# Patient Record
Sex: MALE | Race: WHITE | NOT HISPANIC OR LATINO | Employment: OTHER | URBAN - METROPOLITAN AREA
[De-identification: names, ages, dates, MRNs, and addresses within clinical notes are randomized per-mention and may not be internally consistent; named-entity substitution may affect disease eponyms.]

---

## 2019-09-12 ENCOUNTER — OFFICE VISIT (OUTPATIENT)
Dept: FAMILY MEDICINE CLINIC | Facility: CLINIC | Age: 69
End: 2019-09-12

## 2019-09-12 VITALS
HEIGHT: 67 IN | DIASTOLIC BLOOD PRESSURE: 82 MMHG | SYSTOLIC BLOOD PRESSURE: 142 MMHG | BODY MASS INDEX: 26.18 KG/M2 | TEMPERATURE: 97.6 F | HEART RATE: 81 BPM | WEIGHT: 166.8 LBS | OXYGEN SATURATION: 97 %

## 2019-09-12 DIAGNOSIS — J32.9 BACTERIAL SINUSITIS: Primary | ICD-10-CM

## 2019-09-12 DIAGNOSIS — B96.89 BACTERIAL SINUSITIS: Primary | ICD-10-CM

## 2019-09-12 DIAGNOSIS — J30.89 NON-SEASONAL ALLERGIC RHINITIS, UNSPECIFIED TRIGGER: ICD-10-CM

## 2019-09-12 PROCEDURE — 99204 OFFICE O/P NEW MOD 45 MIN: CPT | Performed by: FAMILY MEDICINE

## 2019-09-12 RX ORDER — ASPIRIN 325 MG
325 TABLET ORAL DAILY PRN
Status: CANCELLED | OUTPATIENT
Start: 2019-09-12

## 2019-09-12 RX ORDER — FLUTICASONE PROPIONATE 50 MCG
2 SPRAY, SUSPENSION (ML) NASAL DAILY
Qty: 11.1 ML | Refills: 2 | Status: SHIPPED | OUTPATIENT
Start: 2019-09-12 | End: 2019-10-16 | Stop reason: SDUPTHER

## 2019-09-12 RX ORDER — AMOXICILLIN 875 MG/1
875 TABLET, COATED ORAL 2 TIMES DAILY
Qty: 20 TABLET | Refills: 0 | Status: SHIPPED | OUTPATIENT
Start: 2019-09-12 | End: 2019-09-22

## 2019-09-12 RX ORDER — FEXOFENADINE HCL 180 MG/1
180 TABLET ORAL DAILY
Qty: 30 TABLET | Refills: 2 | Status: SHIPPED | OUTPATIENT
Start: 2019-09-12

## 2019-09-12 RX ORDER — ASPIRIN 325 MG
325 TABLET ORAL DAILY
COMMUNITY
End: 2019-09-12

## 2019-09-12 NOTE — PROGRESS NOTES
Roberth Bonilla is a 68 y.o. male.     Chief Complaint   Patient presents with   • Sinusitis     Patient is wanting to establish primary care. He has sinus congestion that keeps coming and going        HPI     Pt is a pleasant 68 y.o. YO male here for sinus congestion.  Patient has significant past medical history of allergies. Seen and treated by allergist, underwent allergy shots to completion and symptoms were initially better but returned in a couple years. He is not interested in undergoing shots again for a few years of relief. He has no other significant medical history.     He began to have issues with congestion/runny nose about 6-8 weeks ago initially symptoms seemed to improve but then worsened again a couple weeks ago at which point he felt he needed to see a doctor. He tried multiple OTC medications including mucinex-D and phenylephrine nasal spray, he did not try any antihistamines. He has significant congestion with post nasal drip as well as maxillary sinus pain. He denies fever or chills. No cough or sore throat.     When I discussed health maintenance with him including recommendations for cancer screening and immunizations he said he was not interested in this. Went ahead and discussed recommendations for colon cancer screening, abdominal aneurysm screening ( he has a history of smoking), as well as recommendations for the shingles, pneumonia, tetanus, and influenza vaccines. Patient was somewhat interested in aneurysm screening and shingles vaccine ( he has had shingles before and it was terrible) and said he would give more thought to these.     Blood pressure mildly elevated in office today, on repeat manual later in visit higher still from 142/82 to 150/90. Patient says his blood pressure is always higher in clinical environment. He has a blood pressure cuff at home and checks his blood pressure a couple times a week, it is usually in the 130s systolic.     Exercises at the [facility] 3 times a  week    The following portions of the patient's history were reviewed and updated as appropriate: allergies, current medications, past family history, past medical history, past social history, past surgical history and problem list.    Review of Systems   Constitutional: Negative for appetite change and fever.   HENT: Positive for congestion, postnasal drip and sinus pressure.    Eyes: Negative for redness and itching.   Respiratory: Negative for cough and wheezing.    Cardiovascular: Negative for chest pain and palpitations.   Gastrointestinal: Negative for abdominal pain and diarrhea.   Endocrine: Negative for cold intolerance and heat intolerance.   Genitourinary: Negative for dysuria and frequency.   Musculoskeletal: Negative for back pain and neck pain.   Skin: Negative for rash and wound.   Allergic/Immunologic: Positive for environmental allergies. Negative for food allergies.   Neurological: Positive for dizziness. Negative for headaches.   Hematological: Negative for adenopathy. Does not bruise/bleed easily.   Psychiatric/Behavioral: Negative for agitation and confusion.       Objective  Vitals:    09/12/19 0808   BP: 142/82   Pulse: 81   Temp: 97.6 °F (36.4 °C)   SpO2: 97%        Physical Exam   Constitutional: He is oriented to person, place, and time. He appears well-developed and well-nourished. No distress.   HENT:   Head: Normocephalic and atraumatic.   Right Ear: External ear and ear canal normal. Tympanic membrane is scarred.   Left Ear: External ear and ear canal normal. Tympanic membrane is scarred.   Nose: Mucosal edema, rhinorrhea and septal deviation present.   Mouth/Throat: Oropharynx is clear and moist. No oropharyngeal exudate, posterior oropharyngeal edema or posterior oropharyngeal erythema.   Septal deviation to the left obstructing part of nasal passage with mucosal edema. Positive for maxillary sinus tenderness to percussion bilaterally, frontal sinuses negative   Eyes: Conjunctivae  are normal. Right eye exhibits no discharge. Left eye exhibits no discharge. No scleral icterus.   Cardiovascular: Normal rate and regular rhythm. Exam reveals no gallop and no friction rub.   No murmur heard.  Pulmonary/Chest: Effort normal and breath sounds normal. No respiratory distress. He has no wheezes. He has no rales.   Neurological: He is alert and oriented to person, place, and time.   Skin: Skin is warm and dry.   Psychiatric: He has a normal mood and affect. His behavior is normal. Judgment and thought content normal.   Vitals reviewed.        Current Outpatient Medications:   •  amoxicillin (AMOXIL) 875 MG tablet, Take 1 tablet by mouth 2 (Two) Times a Day for 10 days., Disp: 20 tablet, Rfl: 0  •  fexofenadine (ALLEGRA) 180 MG tablet, Take 1 tablet by mouth Daily., Disp: 30 tablet, Rfl: 2  •  fluticasone (FLONASE) 50 MCG/ACT nasal spray, 2 sprays into the nostril(s) as directed by provider Daily., Disp: 11.1 mL, Rfl: 2    Elsi Lepe was seen today for sinusitis.    Diagnoses and all orders for this visit:    Bacterial sinusitis  -     amoxicillin (AMOXIL) 875 MG tablet; Take 1 tablet by mouth 2 (Two) Times a Day for 10 days.    Non-seasonal allergic rhinitis, unspecified trigger  -     fexofenadine (ALLEGRA) 180 MG tablet; Take 1 tablet by mouth Daily.  -     fluticasone (FLONASE) 50 MCG/ACT nasal spray; 2 sprays into the nostril(s) as directed by provider Daily.    Patient with history of chronic allergic rhinitis and now with worsened sinus pain and congestion which is concerning to me for bacterial sinusitis. Will treat with amoxicillin, also discussed that mucinex -d and phenylephrine are not treating underlying cause of symptoms, recommended instead starting fexofenadine daily ( an allergy medicine he has tolerated well in the past) along with fluticasone daily to decrease inflammation of the mucosa    We discussed recommended health maintenance topics, patient declined for the  time being but will think about them particularly aortic aneurysm screening and shingles vaccine, may re-address at next visit.     Blood pressure - elevated slightly in office setting, normal at home. Discussed with patient to monitor home blood pressure , if consistent over 140/90 then he should return and we will recheck and discuss. Believe secondary to white coat hypertension.       Return if symptoms worsen or fail to improve.      Christina Nelson MD

## 2019-10-16 DIAGNOSIS — J30.89 NON-SEASONAL ALLERGIC RHINITIS, UNSPECIFIED TRIGGER: ICD-10-CM

## 2019-10-16 RX ORDER — FLUTICASONE PROPIONATE 50 MCG
2 SPRAY, SUSPENSION (ML) NASAL DAILY
Qty: 11.1 ML | Refills: 2 | Status: SHIPPED | OUTPATIENT
Start: 2019-10-16

## 2019-10-21 ENCOUNTER — OFFICE VISIT (OUTPATIENT)
Dept: FAMILY MEDICINE CLINIC | Facility: CLINIC | Age: 69
End: 2019-10-21

## 2019-10-21 VITALS
SYSTOLIC BLOOD PRESSURE: 158 MMHG | HEART RATE: 86 BPM | DIASTOLIC BLOOD PRESSURE: 96 MMHG | HEIGHT: 67 IN | OXYGEN SATURATION: 98 % | WEIGHT: 167 LBS | TEMPERATURE: 98.5 F | BODY MASS INDEX: 26.21 KG/M2

## 2019-10-21 DIAGNOSIS — J01.01 ACUTE RECURRENT MAXILLARY SINUSITIS: Primary | ICD-10-CM

## 2019-10-21 PROCEDURE — 99213 OFFICE O/P EST LOW 20 MIN: CPT | Performed by: FAMILY MEDICINE

## 2019-10-21 RX ORDER — AMOXICILLIN AND CLAVULANATE POTASSIUM 875; 125 MG/1; MG/1
1 TABLET, FILM COATED ORAL 2 TIMES DAILY
Qty: 20 TABLET | Refills: 0 | Status: SHIPPED | OUTPATIENT
Start: 2019-10-21 | End: 2019-10-31

## 2019-10-21 RX ORDER — PREDNISONE 20 MG/1
40 TABLET ORAL DAILY
Qty: 10 TABLET | Refills: 0 | Status: SHIPPED | OUTPATIENT
Start: 2019-10-21 | End: 2019-10-26

## 2019-10-21 NOTE — PROGRESS NOTES
Roberth Bonilla is a 68 y.o. male.     Chief Complaint   Patient presents with   • Nasal Congestion     for about 3 days ago ,hAD SAME 3 WEEKS AGO STARTED AGAIN        HPI     Pt is a pleasant 68 y.o. YO male here for recurrent sinusitis that is not improving.  It is located on the right maxillary sinus, present for 3 weeks.  He has completed one course of amoxicillin with complete improvement on the left side but not on the right.  He is able to breathe but does feel like the sinus is obstructed.  He has not been able to irrigate it with penetration.  He is taking Mucinex, Flonase and Allegra.  Otherwise he is afebrile and is systemically feeling well.    The following portions of the patient's history were reviewed and updated as appropriate: allergies, current medications, past family history, past medical history, past social history, past surgical history and problem list.    Review of Systems   Constitutional: Negative.    HENT: Positive for congestion, rhinorrhea, sinus pressure and sinus pain.    Eyes: Negative.    Respiratory: Negative.    Cardiovascular: Negative for chest pain, palpitations and leg swelling.   Gastrointestinal: Negative.    Endocrine: Negative.    Genitourinary: Negative.    Musculoskeletal: Negative.    Skin: Negative.    Allergic/Immunologic: Positive for environmental allergies.   Neurological: Positive for dizziness. Negative for facial asymmetry and headaches.   Hematological: Negative.    Psychiatric/Behavioral: Negative.        Objective  Vitals:    10/21/19 1128   BP: 158/96   Pulse: 86   Temp: 98.5 °F (36.9 °C)   SpO2: 98%        Physical Exam   Constitutional: He is oriented to person, place, and time. He appears well-developed and well-nourished. No distress.   HENT:   Head: Normocephalic.   Right Ear: External ear normal.   Left Ear: External ear normal.   Mouth/Throat: Oropharynx is clear and moist. No oropharyngeal exudate.   Right sided maxillary sinus tenderness, erythema  in the posterior oropharynx.   Eyes: Conjunctivae are normal. Pupils are equal, round, and reactive to light.   Cardiovascular: Normal rate, regular rhythm, normal heart sounds and intact distal pulses.   No murmur heard.  Pulmonary/Chest: Effort normal and breath sounds normal. No respiratory distress.   Abdominal: Soft. Bowel sounds are normal. He exhibits no distension.   Neurological: He is alert and oriented to person, place, and time.   Skin: Skin is warm and dry.   Psychiatric: He has a normal mood and affect. His behavior is normal. Judgment and thought content normal.   Nursing note and vitals reviewed.        Current Outpatient Medications:   •  fexofenadine (ALLEGRA) 180 MG tablet, Take 1 tablet by mouth Daily., Disp: 30 tablet, Rfl: 2  •  fluticasone (FLONASE) 50 MCG/ACT nasal spray, 2 sprays into the nostril(s) as directed by provider Daily., Disp: 11.1 mL, Rfl: 2  •  amoxicillin-clavulanate (AUGMENTIN) 875-125 MG per tablet, Take 1 tablet by mouth 2 (Two) Times a Day for 10 days., Disp: 20 tablet, Rfl: 0  •  predniSONE (DELTASONE) 20 MG tablet, Take 2 tablets by mouth Daily for 5 days., Disp: 10 tablet, Rfl: 0    Procedures    Lab Results (most recent)     Lachelle Lepe was seen today for nasal congestion.    Diagnoses and all orders for this visit:    Acute recurrent maxillary sinusitis  -     amoxicillin-clavulanate (AUGMENTIN) 875-125 MG per tablet; Take 1 tablet by mouth 2 (Two) Times a Day for 10 days.  -     predniSONE (DELTASONE) 20 MG tablet; Take 2 tablets by mouth Daily for 5 days.      Patient with right sided maxillary tenderness, failed one course of amoxicillin.  Augmentin and prednisone prescribed.  If not improving with this course will need to be reevaluated and considered for CT scan of the sinuses and possible referral to ENT.  Patient aware.    Return if symptoms worsen or fail to improve.      Romana Olmos MD

## 2021-03-09 DIAGNOSIS — Z23 IMMUNIZATION DUE: ICD-10-CM
